# Patient Record
Sex: MALE | Race: BLACK OR AFRICAN AMERICAN | NOT HISPANIC OR LATINO | URBAN - NONMETROPOLITAN AREA
[De-identification: names, ages, dates, MRNs, and addresses within clinical notes are randomized per-mention and may not be internally consistent; named-entity substitution may affect disease eponyms.]

---

## 2018-03-27 PROBLEM — H52.03: Noted: 2018-03-27

## 2018-03-27 PROBLEM — H52.4: Noted: 2021-12-10

## 2018-03-27 PROBLEM — H40.013: Noted: 2021-12-10

## 2018-03-27 PROBLEM — H52.223: Noted: 2021-12-10

## 2018-03-27 PROBLEM — H25.813: Noted: 2021-12-10

## 2021-12-10 ENCOUNTER — IMPORTED ENCOUNTER (OUTPATIENT)
Dept: URBAN - NONMETROPOLITAN AREA CLINIC 1 | Facility: CLINIC | Age: 66
End: 2021-12-10

## 2021-12-10 PROBLEM — H25.813: Noted: 2021-12-10

## 2021-12-10 PROBLEM — H40.013: Noted: 2021-12-10

## 2021-12-10 PROCEDURE — 92004 COMPRE OPH EXAM NEW PT 1/>: CPT

## 2021-12-10 PROCEDURE — 76514 ECHO EXAM OF EYE THICKNESS: CPT

## 2021-12-10 PROCEDURE — 92015 DETERMINE REFRACTIVE STATE: CPT

## 2021-12-10 PROCEDURE — 92133 CPTRZD OPH DX IMG PST SGM ON: CPT

## 2021-12-10 NOTE — PATIENT DISCUSSION
HAP OU-Rx issued for new glassesCataract OU-Not yet surgical. -Reviewed symptoms of advancing cataract growth such as glare and halos and decreased vision.-Continue to monitor for now. Pt will notify us if any new symptoms develop. Glaucoma Suspect-Based on C/D age and race (no family history)-Appears stable at this time.-Continue to monitor with exams and testing.-Order pachymetry and ON OCT monitor yearly

## 2022-04-10 ASSESSMENT — VISUAL ACUITY
OS_SC: 20/20
OD_SC: 20/20

## 2022-04-10 ASSESSMENT — TONOMETRY
OS_IOP_MMHG: 12
OD_IOP_MMHG: 14

## 2024-12-17 ENCOUNTER — NEW PATIENT (OUTPATIENT)
Age: 69
End: 2024-12-17

## 2024-12-17 DIAGNOSIS — H40.013: ICD-10-CM

## 2024-12-17 DIAGNOSIS — H52.01: ICD-10-CM

## 2024-12-17 DIAGNOSIS — H52.4: ICD-10-CM

## 2024-12-17 PROCEDURE — 92133 CPTRZD OPH DX IMG PST SGM ON: CPT

## 2024-12-17 PROCEDURE — 92015 DETERMINE REFRACTIVE STATE: CPT

## 2024-12-17 PROCEDURE — 92004 COMPRE OPH EXAM NEW PT 1/>: CPT
